# Patient Record
Sex: FEMALE | Race: OTHER | HISPANIC OR LATINO | Employment: UNEMPLOYED | URBAN - METROPOLITAN AREA
[De-identification: names, ages, dates, MRNs, and addresses within clinical notes are randomized per-mention and may not be internally consistent; named-entity substitution may affect disease eponyms.]

---

## 2023-04-08 PROBLEM — E78.2 MIXED HYPERLIPIDEMIA: Status: ACTIVE | Noted: 2023-04-08

## 2023-04-08 PROBLEM — R42 DIZZINESS: Status: ACTIVE | Noted: 2023-04-08

## 2023-04-08 PROBLEM — I10 PRIMARY HYPERTENSION: Status: ACTIVE | Noted: 2023-04-08

## 2023-04-08 PROBLEM — E03.8 OTHER SPECIFIED HYPOTHYROIDISM: Status: ACTIVE | Noted: 2023-04-08

## 2023-04-08 PROBLEM — Z87.891 PERSONAL HISTORY OF NICOTINE DEPENDENCE: Status: ACTIVE | Noted: 2023-04-08

## 2023-05-18 ENCOUNTER — APPOINTMENT (OUTPATIENT)
Dept: LAB | Facility: CLINIC | Age: 58
End: 2023-05-18

## 2023-05-18 DIAGNOSIS — I10 PRIMARY HYPERTENSION: ICD-10-CM

## 2023-05-18 DIAGNOSIS — R42 DIZZINESS: ICD-10-CM

## 2023-05-18 LAB
ALBUMIN SERPL BCP-MCNC: 3.6 G/DL (ref 3.5–5)
ALP SERPL-CCNC: 131 U/L (ref 46–116)
ALT SERPL W P-5'-P-CCNC: 21 U/L (ref 12–78)
ANION GAP SERPL CALCULATED.3IONS-SCNC: 1 MMOL/L (ref 4–13)
AST SERPL W P-5'-P-CCNC: 34 U/L (ref 5–45)
BASOPHILS # BLD AUTO: 0.03 THOUSANDS/ÂΜL (ref 0–0.1)
BASOPHILS NFR BLD AUTO: 0 % (ref 0–1)
BILIRUB SERPL-MCNC: 0.5 MG/DL (ref 0.2–1)
BUN SERPL-MCNC: 8 MG/DL (ref 5–25)
CALCIUM SERPL-MCNC: 9.1 MG/DL (ref 8.3–10.1)
CHLORIDE SERPL-SCNC: 106 MMOL/L (ref 96–108)
CHOLEST SERPL-MCNC: 166 MG/DL
CO2 SERPL-SCNC: 27 MMOL/L (ref 21–32)
CREAT SERPL-MCNC: 0.8 MG/DL (ref 0.6–1.3)
EOSINOPHIL # BLD AUTO: 0.23 THOUSAND/ÂΜL (ref 0–0.61)
EOSINOPHIL NFR BLD AUTO: 3 % (ref 0–6)
ERYTHROCYTE [DISTWIDTH] IN BLOOD BY AUTOMATED COUNT: 14.1 % (ref 11.6–15.1)
FERRITIN SERPL-MCNC: 16 NG/ML (ref 11–307)
GFR SERPL CREATININE-BSD FRML MDRD: 81 ML/MIN/1.73SQ M
GLUCOSE P FAST SERPL-MCNC: 106 MG/DL (ref 65–99)
HCT VFR BLD AUTO: 39.1 % (ref 34.8–46.1)
HDLC SERPL-MCNC: 36 MG/DL
HGB BLD-MCNC: 13.3 G/DL (ref 11.5–15.4)
IMM GRANULOCYTES # BLD AUTO: 0.02 THOUSAND/UL (ref 0–0.2)
IMM GRANULOCYTES NFR BLD AUTO: 0 % (ref 0–2)
IRON SATN MFR SERPL: 27 % (ref 15–50)
IRON SERPL-MCNC: 96 UG/DL (ref 50–170)
LDLC SERPL CALC-MCNC: 110 MG/DL (ref 0–100)
LYMPHOCYTES # BLD AUTO: 3.22 THOUSANDS/ÂΜL (ref 0.6–4.47)
LYMPHOCYTES NFR BLD AUTO: 38 % (ref 14–44)
MCH RBC QN AUTO: 31.4 PG (ref 26.8–34.3)
MCHC RBC AUTO-ENTMCNC: 34 G/DL (ref 31.4–37.4)
MCV RBC AUTO: 92 FL (ref 82–98)
MONOCYTES # BLD AUTO: 0.58 THOUSAND/ÂΜL (ref 0.17–1.22)
MONOCYTES NFR BLD AUTO: 7 % (ref 4–12)
NEUTROPHILS # BLD AUTO: 4.46 THOUSANDS/ÂΜL (ref 1.85–7.62)
NEUTS SEG NFR BLD AUTO: 52 % (ref 43–75)
NONHDLC SERPL-MCNC: 130 MG/DL
NRBC BLD AUTO-RTO: 0 /100 WBCS
PLATELET # BLD AUTO: 368 THOUSANDS/UL (ref 149–390)
PMV BLD AUTO: 9.3 FL (ref 8.9–12.7)
POTASSIUM SERPL-SCNC: 3.9 MMOL/L (ref 3.5–5.3)
PROT SERPL-MCNC: 7.5 G/DL (ref 6.4–8.4)
RBC # BLD AUTO: 4.23 MILLION/UL (ref 3.81–5.12)
SODIUM SERPL-SCNC: 134 MMOL/L (ref 135–147)
T4 FREE SERPL-MCNC: 0.57 NG/DL (ref 0.61–1.12)
TIBC SERPL-MCNC: 350 UG/DL (ref 250–450)
TRIGL SERPL-MCNC: 98 MG/DL
TSH SERPL DL<=0.05 MIU/L-ACNC: 7.19 UIU/ML (ref 0.45–4.5)
VIT B12 SERPL-MCNC: 334 PG/ML (ref 180–914)
WBC # BLD AUTO: 8.54 THOUSAND/UL (ref 4.31–10.16)

## 2023-05-19 LAB
EST. AVERAGE GLUCOSE BLD GHB EST-MCNC: 123 MG/DL
HBA1C MFR BLD: 5.9 %

## 2023-06-29 ENCOUNTER — TELEPHONE (OUTPATIENT)
Dept: INTERNAL MEDICINE CLINIC | Facility: CLINIC | Age: 58
End: 2023-06-29

## 2023-06-29 NOTE — TELEPHONE ENCOUNTER
Patient called in for results of labs that were done in May. Patient said she have been experiencing abdominal pain and she believes that her labs may indicate why. Please advise.

## 2023-06-30 DIAGNOSIS — E03.8 OTHER SPECIFIED HYPOTHYROIDISM: Primary | ICD-10-CM

## 2023-06-30 NOTE — TELEPHONE ENCOUNTER
The patient did also mention to me at her visit she had a thyroid issue that she was taking levothyroxine for, but she did not know the dose. Can she tell us what dose she is taking? Has she missed any doses?

## 2023-06-30 NOTE — TELEPHONE ENCOUNTER
Her liver and kidney function was normal. Her vitamins were all normal. Her sugar was a little high in the prediabetic range: 5.9. Her cholesterol was a little high as well. Recommend low fat diet and carbs in moderation. Add more fruits, vegetables, and avoid processed food. Her thyroid function was a little low. I would recommend to recheck this. I will place new orders. None of the above really explains abdominal pain. What symptoms is she having?

## 2024-05-01 ENCOUNTER — HOSPITAL ENCOUNTER (OUTPATIENT)
Dept: RADIOLOGY | Facility: HOSPITAL | Age: 59
Discharge: HOME | End: 2024-05-01

## 2024-05-01 DIAGNOSIS — R76.12 NONSPECIFIC REACTION TO CELL MEDIATED IMMUNITY MEASUREMENT OF GAMMA INTERFERON ANTIGEN RESPONSE WITHOUT ACTIVE TUBERCULOSIS: ICD-10-CM

## 2024-05-01 PROCEDURE — 71046 X-RAY EXAM CHEST 2 VIEWS: CPT

## 2024-07-26 ENCOUNTER — TELEPHONE (OUTPATIENT)
Dept: OTHER | Facility: OTHER | Age: 59
End: 2024-07-26

## 2024-07-26 NOTE — TELEPHONE ENCOUNTER
Progress Note: Left message for patient to return call for assistance with making appointments or getting access to medical care.      Mammogram Screening ([facility]):  Pap smear screening (Clinic vs [facility] vs [facility]):  PCP ([facility]inic vs [facility] vs [facility]):     Transportation:     Education:

## 2024-07-30 ENCOUNTER — TELEPHONE (OUTPATIENT)
Dept: INTERNAL MEDICINE CLINIC | Facility: CLINIC | Age: 59
End: 2024-07-30

## 2024-07-30 NOTE — TELEPHONE ENCOUNTER
----- Message from Deirdre Gorman PA-C sent at 7/29/2024  1:46 PM EDT -----  Please call patient to schedule annual physical. Thank you

## 2024-12-13 ENCOUNTER — TELEPHONE (OUTPATIENT)
Dept: INTERNAL MEDICINE CLINIC | Facility: CLINIC | Age: 59
End: 2024-12-13

## 2024-12-13 NOTE — TELEPHONE ENCOUNTER
Message  Received: Today  Deirdre Miller PA-C [facility]  Please call patient to schedule annual physical. Thank you

## 2025-04-05 ENCOUNTER — APPOINTMENT (EMERGENCY)
Dept: RADIOLOGY | Facility: HOSPITAL | Age: 60
End: 2025-04-05

## 2025-04-05 ENCOUNTER — HOSPITAL ENCOUNTER (EMERGENCY)
Facility: HOSPITAL | Age: 60
Discharge: HOME | End: 2025-04-06
Attending: EMERGENCY MEDICINE

## 2025-04-05 VITALS
RESPIRATION RATE: 18 BRPM | DIASTOLIC BLOOD PRESSURE: 98 MMHG | HEART RATE: 114 BPM | TEMPERATURE: 98 F | OXYGEN SATURATION: 100 % | SYSTOLIC BLOOD PRESSURE: 196 MMHG

## 2025-04-05 DIAGNOSIS — R51.9 HEADACHE: ICD-10-CM

## 2025-04-05 DIAGNOSIS — I65.09 VERTEBRAL ARTERY STENOSIS: ICD-10-CM

## 2025-04-05 DIAGNOSIS — I10 HYPERTENSION: Primary | ICD-10-CM

## 2025-04-05 LAB
ALBUMIN SERPL BCG-MCNC: 3.9 G/DL (ref 3.5–5)
ALP SERPL-CCNC: 113 U/L (ref 34–104)
ALT SERPL W P-5'-P-CCNC: 13 U/L (ref 7–52)
ANION GAP SERPL CALCULATED.3IONS-SCNC: 6 MMOL/L (ref 4–13)
AST SERPL W P-5'-P-CCNC: 29 U/L (ref 13–39)
BASOPHILS # BLD AUTO: 0.06 THOUSANDS/ÂΜL (ref 0–0.1)
BASOPHILS NFR BLD AUTO: 1 % (ref 0–1)
BILIRUB SERPL-MCNC: 0.28 MG/DL (ref 0.2–1)
BUN SERPL-MCNC: 13 MG/DL (ref 5–25)
CALCIUM SERPL-MCNC: 9.1 MG/DL (ref 8.4–10.2)
CARDIAC TROPONIN I PNL SERPL HS: 3 NG/L (ref ?–50)
CHLORIDE SERPL-SCNC: 103 MMOL/L (ref 96–108)
CO2 SERPL-SCNC: 26 MMOL/L (ref 21–32)
CREAT SERPL-MCNC: 0.78 MG/DL (ref 0.6–1.3)
EOSINOPHIL # BLD AUTO: 0.42 THOUSAND/ÂΜL (ref 0–0.61)
EOSINOPHIL NFR BLD AUTO: 4 % (ref 0–6)
ERYTHROCYTE [DISTWIDTH] IN BLOOD BY AUTOMATED COUNT: 14.4 % (ref 11.6–15.1)
GFR SERPL CREATININE-BSD FRML MDRD: 82 ML/MIN/1.73SQ M
GLUCOSE SERPL-MCNC: 91 MG/DL (ref 65–140)
HCT VFR BLD AUTO: 36.3 % (ref 34.8–46.1)
HGB BLD-MCNC: 12.3 G/DL (ref 11.5–15.4)
IMM GRANULOCYTES # BLD AUTO: 0.02 THOUSAND/UL (ref 0–0.2)
IMM GRANULOCYTES NFR BLD AUTO: 0 % (ref 0–2)
LYMPHOCYTES # BLD AUTO: 4.66 THOUSANDS/ÂΜL (ref 0.6–4.47)
LYMPHOCYTES NFR BLD AUTO: 48 % (ref 14–44)
MCH RBC QN AUTO: 30.5 PG (ref 26.8–34.3)
MCHC RBC AUTO-ENTMCNC: 33.9 G/DL (ref 31.4–37.4)
MCV RBC AUTO: 90 FL (ref 82–98)
MONOCYTES # BLD AUTO: 0.63 THOUSAND/ÂΜL (ref 0.17–1.22)
MONOCYTES NFR BLD AUTO: 7 % (ref 4–12)
NEUTROPHILS # BLD AUTO: 3.77 THOUSANDS/ÂΜL (ref 1.85–7.62)
NEUTS SEG NFR BLD AUTO: 40 % (ref 43–75)
NRBC BLD AUTO-RTO: 0 /100 WBCS
PLATELET # BLD AUTO: 359 THOUSANDS/UL (ref 149–390)
PMV BLD AUTO: 8.8 FL (ref 8.9–12.7)
POTASSIUM SERPL-SCNC: 4.3 MMOL/L (ref 3.5–5.3)
PROT SERPL-MCNC: 7 G/DL (ref 6.4–8.4)
RBC # BLD AUTO: 4.03 MILLION/UL (ref 3.81–5.12)
SODIUM SERPL-SCNC: 135 MMOL/L (ref 135–147)
WBC # BLD AUTO: 9.56 THOUSAND/UL (ref 4.31–10.16)

## 2025-04-05 PROCEDURE — 70498 CT ANGIOGRAPHY NECK: CPT

## 2025-04-05 PROCEDURE — 85025 COMPLETE CBC W/AUTO DIFF WBC: CPT | Performed by: STUDENT IN AN ORGANIZED HEALTH CARE EDUCATION/TRAINING PROGRAM

## 2025-04-05 PROCEDURE — 36415 COLL VENOUS BLD VENIPUNCTURE: CPT | Performed by: STUDENT IN AN ORGANIZED HEALTH CARE EDUCATION/TRAINING PROGRAM

## 2025-04-05 PROCEDURE — 84484 ASSAY OF TROPONIN QUANT: CPT | Performed by: STUDENT IN AN ORGANIZED HEALTH CARE EDUCATION/TRAINING PROGRAM

## 2025-04-05 PROCEDURE — 99284 EMERGENCY DEPT VISIT MOD MDM: CPT

## 2025-04-05 PROCEDURE — 93005 ELECTROCARDIOGRAM TRACING: CPT

## 2025-04-05 PROCEDURE — 70496 CT ANGIOGRAPHY HEAD: CPT

## 2025-04-05 PROCEDURE — 96374 THER/PROPH/DIAG INJ IV PUSH: CPT

## 2025-04-05 PROCEDURE — 80053 COMPREHEN METABOLIC PANEL: CPT | Performed by: STUDENT IN AN ORGANIZED HEALTH CARE EDUCATION/TRAINING PROGRAM

## 2025-04-05 RX ORDER — MECLIZINE HYDROCHLORIDE 25 MG/1
25 TABLET ORAL ONCE
Status: COMPLETED | OUTPATIENT
Start: 2025-04-05 | End: 2025-04-05

## 2025-04-05 RX ORDER — ACETAMINOPHEN 325 MG/1
975 TABLET ORAL ONCE
Status: COMPLETED | OUTPATIENT
Start: 2025-04-05 | End: 2025-04-05

## 2025-04-05 RX ORDER — ONDANSETRON 2 MG/ML
4 INJECTION INTRAMUSCULAR; INTRAVENOUS ONCE
Status: COMPLETED | OUTPATIENT
Start: 2025-04-05 | End: 2025-04-05

## 2025-04-05 RX ADMIN — MECLIZINE HYDROCHLORIDE 25 MG: 25 TABLET ORAL at 21:41

## 2025-04-05 RX ADMIN — ACETAMINOPHEN 975 MG: 325 TABLET, FILM COATED ORAL at 21:41

## 2025-04-05 RX ADMIN — ONDANSETRON 4 MG: 2 INJECTION INTRAMUSCULAR; INTRAVENOUS at 21:43

## 2025-04-05 RX ADMIN — IOHEXOL 85 ML: 350 INJECTION, SOLUTION INTRAVENOUS at 22:48

## 2025-04-06 LAB
2HR DELTA HS TROPONIN: 1 NG/L
ATRIAL RATE: 94 BPM
CARDIAC TROPONIN I PNL SERPL HS: 4 NG/L (ref ?–50)
P AXIS: 72 DEGREES
PR INTERVAL: 150 MS
QRS AXIS: 81 DEGREES
QRSD INTERVAL: 72 MS
QT INTERVAL: 366 MS
QTC INTERVAL: 457 MS
T WAVE AXIS: 53 DEGREES
VENTRICULAR RATE: 94 BPM

## 2025-04-06 PROCEDURE — 93010 ELECTROCARDIOGRAM REPORT: CPT | Performed by: INTERNAL MEDICINE

## 2025-04-06 RX ORDER — AMLODIPINE BESYLATE 5 MG/1
5 TABLET ORAL DAILY
Qty: 30 TABLET | Refills: 0 | Status: SHIPPED | OUTPATIENT
Start: 2025-04-06

## 2025-04-06 NOTE — ED ATTENDING ATTESTATION
4/5/2025  I, Delbert Foreman DO, saw and evaluated the patient. I have discussed the patient with the resident/non-physician practitioner and agree with the resident's/non-physician practitioner's findings, Plan of Care, and MDM as documented in the resident's/non-physician practitioner's note, except where noted. All available labs and Radiology studies were reviewed.  I was present for key portions of any procedure(s) performed by the resident/non-physician practitioner and I was immediately available to provide assistance.       At this point I agree with the current assessment done in the Emergency Department.  I have conducted an independent evaluation of this patient a history and physical is as follows:    Chief Complaint   Patient presents with    High Blood Pressure     High blood pressure reading 182 systolic at home, headache, nausea.          59-year-old female, reported history of strokes well living in [place] presents for chest pain headache paresthesias dizziness and a variety of complaints.  Paresthesias are already in a random variety of areas not consistent with a central etiology dizziness is lightheadedness.  No weakness anywhere.  This started after she checked her blood pressure which was high at home.  Is on blood pressure medication and is compliant.  Exam is unremarkable other than hypertension.  Plan check CT head labs for metabolic derangement anemia doubt stroke doubt cardiac.  EKG interpreted me as normal sinus rhythm.  Will follow-up with PCP for hypertension if everything else is normal    ED Course         Critical Care Time  Procedures

## 2025-04-06 NOTE — DISCHARGE INSTRUCTIONS
If you change your mind about admission to the hospital for further workup please return immediately to the emergency department.

## 2025-04-06 NOTE — ED PROVIDER NOTES
Time reflects when diagnosis was documented in both MDM as applicable and the Disposition within this note       Time User Action Codes Description Comment    4/5/2025 11:49 PM Amilcar Gomez [I10] Hypertension     4/5/2025 11:49 PM Amilcar Gomez [R51.9] Headache     4/6/2025 12:34 AM Amilcar Gomez [I65.09] Vertebral artery stenosis           ED Disposition       ED Disposition   AMA    Condition   --    Date/Time   Sun Apr 6, 2025 12:32 AM    Comment   Date: 4/6/2025  Patient: Berkley Perez  Admitted: 4/5/2025  8:05 PM  Attending Provider: Delbert Foreman,     Berkley Perez or her authorized caregiver has made the decision for the patient to leave the emergency department against  the advice of her attending physician. She or her authorized caregiver has been informed and understands the inherent risks, including death.  She or her authorized caregiver has decided to accept the responsibility for this decision. Berkley Hawkins rcia and all necessary parties have been advised that she may return for further evaluation or treatment. Her condition at time of discharge was stable.  Berkley Perez had current vital signs as follows:  BP (!) 196/98 (BP Location: Left arm)    Pulse (!) 114   Temp 98 °F (36.7 °C) (Oral)   Resp 18                Assessment & Plan       Medical Decision Making  59-year-old female with reported history of strokes and hypertension presents to the emergency department today for evaluation of dizziness, paresthesias with no specific neurologic pattern, high blood pressure, and headache.  On examination patient reports dizziness.  No nystagmus however extraocular movements from side-to-side do elicit worsening dizziness.  Positional changes also elicit dizziness.  They are made of her examination does not reveal any neurologic deficits.  She complains of bilateral upper and left lower extremity numbness has been ongoing for greater than 1 week.  Cardiopulmonary  semination reassuring.  Her neurologic symptoms appear to be secondary to positional vertigo given her history and exam.  No findings consistent with central cause, however given her age and history of CVA in the past, will perform CT of the head and neck.  Her chest pain open worked up with a cardiac evaluation to include CBC CMP troponin and EKG.  Labs are largely unremarkable.  Neutral troponin was 3 with a delta of 1.  EKG revealed heart rate of 94 with no ST elevation or depression consistent with ACS or findings indicative of new pathologic dysrhythmia.  CT of the head and neck revealed no intracranial abnormality but it was noted that she had focal occlusion of the left ICA origin and a small caliber of the left ICA and small caliber of the left vertebral artery.  Plaque versus congenital.  I discussed results with patient advised her to stay in the hospital for neurologic and possibly vascular evaluation.  Patient adamant that she does not want to stay despite known risks which we discussed during her visit.  I informed the patient that she can come back for admission at any point should she change her mind.  Signed out AMA.  Remained hemodynamically stable while under my care.  Prescription for amlodipine called into pharmacy of choice and advised her to follow-up with her primary care physician.    Amount and/or Complexity of Data Reviewed  Labs: ordered.  Radiology: ordered. Decision-making details documented in ED Course.    Risk  OTC drugs.  Prescription drug management.        ED Course as of 04/06/25 1609   Sun Apr 06, 2025   0017 CTA head and neck w wo contrast       Medications   acetaminophen (TYLENOL) tablet 975 mg (975 mg Oral Given 4/5/25 2141)   ondansetron (ZOFRAN) injection 4 mg (4 mg Intravenous Given 4/5/25 2143)   meclizine (ANTIVERT) tablet 25 mg (25 mg Oral Given 4/5/25 2141)   iohexol (OMNIPAQUE) 350 MG/ML injection (MULTI-DOSE) 85 mL (85 mL Intravenous Given 4/5/25 2248)       ED Risk  Strat Scores                                                History of Present Illness       Chief Complaint   Patient presents with    High Blood Pressure     High blood pressure reading 182 systolic at home, headache, nausea.        Past Medical History:   Diagnosis Date    Hypertension       No past surgical history on file.   No family history on file.   Social History     Tobacco Use    Smoking status: Every Day     Current packs/day: 0.25     Types: Cigarettes     Passive exposure: Never    Smokeless tobacco: Never   Vaping Use    Vaping status: Never Used   Substance Use Topics    Alcohol use: Not Currently    Drug use: Not Currently      E-Cigarette/Vaping    E-Cigarette Use Never User       E-Cigarette/Vaping Substances    Nicotine No     THC No     CBD No     Flavoring No     Other No     Unknown No       I have reviewed and agree with the history as documented.     59-year-old female with reported history of 3 strokes while living in the [place], hypertension, presents to the emergency department today for evaluation of chest pain, headache, dizziness, nausea, extremity numbness and tingling.  About 1 week ago she began to experience left greater than right lower extremity numbness and tingling which was followed by bilateral upper extremity numbness and tingling.  Denies weakness.  Denies facial droop or slurred speech.  Family has not noticed any difference in her behavior or spoken or receptive speech.  Dizziness gets worse with movements and improves with sitting still.  Denies unsteady gait.  No visual disturbances.  Patient tells me she is out of her blood pressure medication and began to experience high blood pressure measurements at home which have been associated with chest pain, headache, dizziness and nausea.  The symptoms began 3 days ago.  Currently denies chest pain; does not radiate when present.  No shortness of breath.  She has not vomited or had any other GI symptoms or other  nausea.  No fall with head strikes or other traumas.  Headache resolves when she takes Tylenol.        Review of Systems   Constitutional:  Negative for activity change, chills, fatigue and fever.   HENT:  Negative for congestion and facial swelling.    Respiratory:  Negative for shortness of breath.    Cardiovascular:  Positive for chest pain. Negative for palpitations.   Gastrointestinal:  Positive for nausea. Negative for abdominal pain, diarrhea and vomiting.   Genitourinary:  Negative for dysuria, flank pain and urgency.   Musculoskeletal:  Negative for back pain and neck pain.   Neurological:  Positive for numbness. Negative for dizziness, tremors, seizures, syncope, speech difficulty, weakness, light-headedness and headaches.   Psychiatric/Behavioral:  Negative for agitation and confusion.            Objective       ED Triage Vitals   Temperature Pulse Blood Pressure Respirations SpO2 Patient Position - Orthostatic VS   04/05/25 2003 04/05/25 2003 04/05/25 2003 04/05/25 2003 04/05/25 2003 04/05/25 2003   98 °F (36.7 °C) (!) 114 (!) 196/98 18 100 % Sitting      Temp Source Heart Rate Source BP Location FiO2 (%) Pain Score    04/05/25 2003 04/05/25 2003 04/05/25 2003 -- 04/05/25 2141    Oral Monitor Left arm  10 - Worst Possible Pain      Vitals      Date and Time Temp Pulse SpO2 Resp BP Pain Score FACES Pain Rating User   04/05/25 2141 -- -- -- -- -- 10 - Worst Possible Pain -- GR   04/05/25 2003 98 °F (36.7 °C) 114 100 % 18 196/98 -- -- CC            Physical Exam  Vitals reviewed.   Constitutional:       General: She is not in acute distress.     Appearance: Normal appearance. She is not ill-appearing.   HENT:      Head: Normocephalic and atraumatic.      Nose: Nose normal.      Mouth/Throat:      Mouth: Mucous membranes are moist.      Pharynx: Oropharynx is clear.   Eyes:      Extraocular Movements: Extraocular movements intact.      Pupils: Pupils are equal, round, and reactive to light.      Comments: No  nystagmus.  Becomes dizzy while testing for nystagmus.  extraocular events are intact and painless.   Cardiovascular:      Rate and Rhythm: Normal rate and regular rhythm.      Pulses: Normal pulses.      Heart sounds: Normal heart sounds. No murmur heard.     No friction rub.   Pulmonary:      Effort: Pulmonary effort is normal. No respiratory distress.      Breath sounds: Normal breath sounds.   Abdominal:      General: Abdomen is flat.      Palpations: Abdomen is soft.      Tenderness: There is no abdominal tenderness. There is no guarding.   Musculoskeletal:      Right lower leg: No edema.      Left lower leg: No edema.   Skin:     General: Skin is warm and dry.      Findings: No bruising or rash.   Neurological:      Mental Status: She is alert and oriented to person, place, and time.      Cranial Nerves: No cranial nerve deficit.      Sensory: Sensory deficit present.      Motor: No weakness.      Comments: Subjective complaint of numbness and tingling in the left lower extremity and bilateral upper extremities.  Complain of dizziness with no nystagmus.  No neurologic deficits on examination including slurred speech visual deficits, diplopia.         Results Reviewed       Procedure Component Value Units Date/Time    HS Troponin I 2hr [[id number]]  (Normal) Collected: 04/05/25 2338    Lab Status: Final result Specimen: Blood from Arm, Right Updated: 04/06/25 0015     hs TnI 2hr 4 ng/L      Delta 2hr hsTnI 1 ng/L     Comprehensive metabolic panel [[id number]]  (Abnormal) Collected: 04/05/25 2141    Lab Status: Final result Specimen: Blood from Arm, Right Updated: 04/05/25 2211     Sodium 135 mmol/L      Potassium 4.3 mmol/L      Chloride 103 mmol/L      CO2 26 mmol/L      ANION GAP 6 mmol/L      BUN 13 mg/dL      Creatinine 0.78 mg/dL      Glucose 91 mg/dL      Calcium 9.1 mg/dL      AST 29 U/L      ALT 13 U/L      Alkaline Phosphatase 113 U/L      Total Protein 7.0 g/dL      Albumin 3.9 g/dL      Total Bilirubin  0.28 mg/dL      eGFR 82 ml/min/1.73sq m     Narrative:      National Kidney Disease Foundation guidelines for Chronic Kidney Disease (CKD):     Stage 1 with normal or high GFR (GFR > 90 mL/min/1.73 square meters)    Stage 2 Mild CKD (GFR = 60-89 mL/min/1.73 square meters)    Stage 3A Moderate CKD (GFR = 45-59 mL/min/1.73 square meters)    Stage 3B Moderate CKD (GFR = 30-44 mL/min/1.73 square meters)    Stage 4 Severe CKD (GFR = 15-29 mL/min/1.73 square meters)    Stage 5 End Stage CKD (GFR <15 mL/min/1.73 square meters)  Note: GFR calculation is accurate only with a steady state creatinine    HS Troponin 0hr (reflex protocol) [[id number]]  (Normal) Collected: 04/05/25 2141    Lab Status: Final result Specimen: Blood from Arm, Right Updated: 04/05/25 2210     hs TnI 0hr 3 ng/L     CBC and differential [[id number]]  (Abnormal) Collected: 04/05/25 2141    Lab Status: Final result Specimen: Blood from Arm, Right Updated: 04/05/25 2151     WBC 9.56 Thousand/uL      RBC 4.03 Million/uL      Hemoglobin 12.3 g/dL      Hematocrit 36.3 %      MCV 90 fL      MCH 30.5 pg      MCHC 33.9 g/dL      RDW 14.4 %      MPV 8.8 fL      Platelets 359 Thousands/uL      nRBC 0 /100 WBCs      Segmented % 40 %      Immature Grans % 0 %      Lymphocytes % 48 %      Monocytes % 7 %      Eosinophils Relative 4 %      Basophils Relative 1 %      Absolute Neutrophils 3.77 Thousands/µL      Absolute Immature Grans 0.02 Thousand/uL      Absolute Lymphocytes 4.66 Thousands/µL      Absolute Monocytes 0.63 Thousand/µL      Eosinophils Absolute 0.42 Thousand/µL      Basophils Absolute 0.06 Thousands/µL             CTA head and neck w wo contrast   Final Interpretation by Ron Brito DO (04/06 0017)      CT Brain: No acute intracranial abnormality.      CT Angiography: Focal occlusion left ICA origin. The left ICA is small in caliber throughout its visualized course which may be secondary to atherosclerosis or diminished flow.      Left vertebral  artery is string-like in caliber throughout its visualized course. This may be congenital or secondary to atherosclerosis      Atherosclerosis with stenosis involving the cavernous through clinoid ICAs, moderate on the left and mild on the right.               The study was marked in EPIC for immediate notification.            Workstation performed: [id number]             Procedures    ED Medication and Procedure Management   Prior to Admission Medications   Prescriptions Last Dose Informant Patient Reported? Taking?   meclizine (ANTIVERT) 25 mg tablet   No No   Sig: Take 1 tablet (25 mg total) by mouth every 8 (eight) hours as needed for dizziness or nausea      Facility-Administered Medications: None     Discharge Medication List as of 4/6/2025 12:35 AM        START taking these medications    Details   amLODIPine (NORVASC) 5 mg tablet Take 1 tablet (5 mg total) by mouth daily, Starting Sun 4/6/2025, Normal           CONTINUE these medications which have NOT CHANGED    Details   meclizine (ANTIVERT) 25 mg tablet Take 1 tablet (25 mg total) by mouth every 8 (eight) hours as needed for dizziness or nausea, Starting Fri 4/7/2023, Normal             ED SEPSIS DOCUMENTATION   Time reflects when diagnosis was documented in both MDM as applicable and the Disposition within this note       Time User Action Codes Description Comment    4/5/2025 11:49 PM Amilcar Gomez [I10] Hypertension     4/5/2025 11:49 PM Amilcar Gomez [R51.9] Headache     4/6/2025 12:34 AM Amilcar Gomez [I65.09] Vertebral artery stenosis                  Amilcar Gomez MD  04/06/25 2120

## 2025-07-08 ENCOUNTER — TELEPHONE (OUTPATIENT)
Dept: INTERNAL MEDICINE CLINIC | Facility: CLINIC | Age: 60
End: 2025-07-08